# Patient Record
(demographics unavailable — no encounter records)

---

## 2017-04-16 NOTE — PDOC
History of Present Illness





- General


Chief Complaint: Back Pain


Stated Complaint: BACK PAIN


Time Seen by Provider: 04/16/17 20:02


History Source: Patient


Exam Limitations: No Limitations





- History of Present Illness


Initial Comments: 


04/16/17 20:57











My Chief Complaint: Pain in lower abdomen yesterday suprapubic area today








History of present illness: Patient is a 20-year-old female here today 

complaining of having suprapubic tenderness yesterday none today with 

development of left lower back pain that started today along the paraspinal 

muscles lumbar area. Patient reports that she bends over to  her 

daughter does not use proper lifting techniques. Patient denies any urinary 

symptoms no dysuria or frequency or hematuria or urgency. She denies having 

pain with sexual relations or increase in vaginal discharge. Patient does 

report having unprotected sex last week. Patient reports that she should be 

getting her menstrual cycle soon. Patient reports that back pain currently is 

an 8 out of 10 worse with lying down on her left left side. Patient denies any 

previous back injuries. Denies any radiation of pain down her legs or any 

saddle anesthesia or any incontinency.





04/16/17 21:31














04/16/17 21:41





Timing/Duration: changing over time


Severity: moderate (left back pain )


Associated Symptoms: reports: other (unprotected sex last week, suprapubic 

tenderness yesterday none today, left lower back pain today )





Past History





- Past Medical History


Allergies/Adverse Reactions: 


 Allergies











Allergy/AdvReac Type Severity Reaction Status Date / Time


 


No Known Allergies Allergy   Verified 04/16/17 19:48











Home Medications: 


Ambulatory Orders





Cyclobenzaprine HCl [Flexeril 10 mg] 10 mg PO BID PRN #13 tablet 04/16/17 


Naproxen [Naprosyn -] 500 mg PO BID PRN #14 tablet 04/16/17 








Asthma: No


Cancer: No


Cardiac Disorders: No


Diabetes: No


HTN: No


Seizures: No


Thyroid Disease: No





- Immunization History


Immunization Up to Date: Yes





- Psycho/Social/Smoking Cessation Hx


Suicidal Ideation: No


Smoking History: Never smoked


Have you smoked in the past 12 months: No


Information on smoking cessation initiated: No


Hx Alcohol Use: No


Drug/Substance Use Hx: No


Substance Use Type: None


Hx Substance Use Treatment: No





**Review of Systems





- Review of Systems


Able to Perform ROS?: Yes


Constitutional: No: Symptoms Reported


HEENTM: No: Symptoms Reported


Respiratory: No: Symptoms reported


Cardiac (ROS): No: Symptoms Reported


ABD/GI: No: Symptoms Reported


: Yes: Other (suprapubic tenderness yesterday none today )


Musculoskeletal: Yes: Back Pain (left lower back pain )


Integumentary: No: Symptoms Reported





*Physical Exam





- Vital Signs


 Last Vital Signs











Temp Pulse Resp BP Pulse Ox


 


 98.6 F   87   17   104/55   100 


 


 04/16/17 19:48  04/16/17 19:48  04/16/17 19:48  04/16/17 19:48  04/16/17 19:48














- Physical Exam


General Appearance: Yes: Appropriately Dressed


Respiratory/Chest: positive: Lungs Clear, Normal Breath Sounds.  negative: 

Chest Tender, Respiratory Distress


Cardiovascular: positive: Regular Rhythm, Regular Rate, S1, S2


Female Pelvic Exam: positive: normal external exam, cervical os closed, normal 

adnexa, normal size ovaries, discharge (white milky small amount ), other.  

negative: CMT, lesions, Bartholin mass, Scalene Gland, adnexal tenderness, 

vaginal bleeding


Gastrointestinal/Abdominal: positive: Normal Bowel Sounds, Soft.  negative: 

Tender, Organomegaly, Distended, Guarding, Rebound, Tenderness, Hepatomegaly, 

Spleenomegaly


Musculoskeletal: positive: Other (lumbar paraspinal muscle left tenderness).  

negative: CVA Tenderness, CVA Tenderness (R), CVA Tenderness (L), Decreased 

Range of Motion, Muscle Spasm, Vertebral Tenderness


Integumentary: positive: Normal Color


Neurologic: positive: Alert, Normal Response, Motor Strength 5/5, Respond to 

painful stimul, Responsive, Other (neg).  negative: Numbness, Sensory Deficit (

SLR b/l )





Medical Decision Making





- Medical Decision Making


Patient is a 20-year-old female here today complaining of having suprapubic 

tenderness yesterday none today with development of left lower back pain that 

started today along the paraspinal muscles lumbar area. Patient reports that 

she bends over to  her daughter does not use proper lifting techniques. 

Patient denies any urinary symptoms no dysuria or frequency or hematuria or 

urgency. She denies having pain with sexual relations or increase in vaginal 

discharge. Patient does report having unprotected sex last week. Patient 

reports that she should be getting her menstrual cycle soon. Patient reports 

that back pain currently is an 8 out of 10 worse with lying down on her left 

left side. Patient denies any previous back injuries. Denies any radiation of 

pain down her legs or any saddle anesthesia or any incontinency.











Low back strain 





unprotected sex 








PLAN: 





pt.does not want to be treated for potential 





ua


urine hcg negative


urine C & S 


chlamydia/gc amplification 


RPR





Naprosyn 500 mg po now than q12 hrs prn pain for 7 days


flexeril 10 mg po now than q12 hr prn X 7 days





follow up with primary 





04/16/17 21:26


 Laboratory Tests











  04/16/17 04/16/17





  20:10 20:10


 


Urine Color  Yellow 


 


Urine Appearance  Clear 


 


Urine pH  5.0 


 


Ur Specific Gravity  1.033 


 


Urine Glucose (UA)  Negative 


 


Urine Ketones  Trace H 


 


Urine Blood  Negative 


 


Urine Nitrite  Negative 


 


Urine Bilirubin  Negative 


 


Urine Urobilinogen  Negative 


 


Ur Leukocyte Esterase  Trace H 


 


Urine RBC  4 


 


Urine WBC  3 


 


Ur Epithelial Cells  Rare 


 


Urine Mucus  Rare 


 


Urine HCG, Qual   Negative














04/16/17 21:33








04/16/17 21:38








04/16/17 21:39








04/16/17 21:43








*DC/Admit/Observation/Transfer


Diagnosis at time of Disposition: 


Low back strain


Qualifiers:


 Encounter type: initial encounter Qualified Code(s): S39.012A - Strain of 

muscle, fascia and tendon of lower back, initial encounter





- Discharge Dispostion


Disposition: HOME


Condition at time of disposition: Stable





- Prescriptions


Prescriptions: 


Cyclobenzaprine HCl [Flexeril 10 mg] 10 mg PO BID PRN #13 tablet


 PRN Reason: Muscle Spasms


Naproxen [Naprosyn -] 500 mg PO BID PRN #14 tablet


 PRN Reason: Pain





- Referrals


Referrals: 


Delilah Ness [Primary Care Provider] - 





- Patient Instructions


Additional Instructions: 











Avoid bending over to  your daughter use proper bending technique is 

demonstrated in exam room











We will call you if further treatment is needed as a result of your pending labs














Return to emergency room if symptoms worsen or any new symptoms develop











Follow-up with your primary care provider within the next few days

















Patient voiced understanding of discharge instructions and all questions were 

answered